# Patient Record
Sex: MALE | Race: ASIAN | NOT HISPANIC OR LATINO | Employment: OTHER | URBAN - METROPOLITAN AREA
[De-identification: names, ages, dates, MRNs, and addresses within clinical notes are randomized per-mention and may not be internally consistent; named-entity substitution may affect disease eponyms.]

---

## 2019-02-04 ENCOUNTER — TELEPHONE (OUTPATIENT)
Dept: SCHEDULING | Facility: IMAGING CENTER | Age: 69
End: 2019-02-04

## 2019-02-05 ENCOUNTER — OFFICE VISIT (OUTPATIENT)
Dept: INTERNAL MEDICINE | Facility: MEDICAL CENTER | Age: 69
End: 2019-02-05
Payer: MEDICARE

## 2019-02-05 VITALS
OXYGEN SATURATION: 96 % | DIASTOLIC BLOOD PRESSURE: 66 MMHG | SYSTOLIC BLOOD PRESSURE: 114 MMHG | TEMPERATURE: 97.7 F | HEIGHT: 64 IN | BODY MASS INDEX: 28.92 KG/M2 | RESPIRATION RATE: 19 BRPM | WEIGHT: 169.4 LBS | HEART RATE: 62 BPM

## 2019-02-05 DIAGNOSIS — G47.33 OSA (OBSTRUCTIVE SLEEP APNEA): ICD-10-CM

## 2019-02-05 DIAGNOSIS — I10 ESSENTIAL HYPERTENSION: ICD-10-CM

## 2019-02-05 DIAGNOSIS — E11.9 TYPE 2 DIABETES MELLITUS WITHOUT COMPLICATION, WITHOUT LONG-TERM CURRENT USE OF INSULIN (HCC): ICD-10-CM

## 2019-02-05 DIAGNOSIS — E03.9 HYPOTHYROIDISM, UNSPECIFIED TYPE: ICD-10-CM

## 2019-02-05 DIAGNOSIS — E78.5 HYPERLIPIDEMIA, UNSPECIFIED HYPERLIPIDEMIA TYPE: ICD-10-CM

## 2019-02-05 DIAGNOSIS — D56.3 BETA THALASSEMIA MINOR: ICD-10-CM

## 2019-02-05 PROCEDURE — G0009 ADMIN PNEUMOCOCCAL VACCINE: HCPCS | Performed by: INTERNAL MEDICINE

## 2019-02-05 PROCEDURE — 90670 PCV13 VACCINE IM: CPT | Performed by: INTERNAL MEDICINE

## 2019-02-05 PROCEDURE — G0008 ADMIN INFLUENZA VIRUS VAC: HCPCS | Performed by: INTERNAL MEDICINE

## 2019-02-05 PROCEDURE — 99204 OFFICE O/P NEW MOD 45 MIN: CPT | Mod: 25 | Performed by: INTERNAL MEDICINE

## 2019-02-05 PROCEDURE — 90662 IIV NO PRSV INCREASED AG IM: CPT | Performed by: INTERNAL MEDICINE

## 2019-02-05 RX ORDER — LEVOTHYROXINE SODIUM 0.12 MG/1
125 TABLET ORAL
Qty: 30 TAB | Refills: 6 | Status: SHIPPED | OUTPATIENT
Start: 2019-02-05

## 2019-02-05 RX ORDER — M-VIT,TX,IRON,MINS/CALC/FOLIC 27MG-0.4MG
1 TABLET ORAL DAILY
COMMUNITY

## 2019-02-05 RX ORDER — OLMESARTAN MEDOXOMIL 20 MG/1
20 TABLET ORAL DAILY
Qty: 30 TAB | Refills: 6 | Status: SHIPPED | OUTPATIENT
Start: 2019-02-05

## 2019-02-05 RX ORDER — ATORVASTATIN CALCIUM 10 MG/1
10 TABLET, FILM COATED ORAL DAILY
Qty: 30 TAB | Refills: 6 | Status: SHIPPED | OUTPATIENT
Start: 2019-02-05

## 2019-02-05 RX ORDER — METOPROLOL SUCCINATE 50 MG/1
50 TABLET, EXTENDED RELEASE ORAL DAILY
Qty: 30 TAB | Refills: 6 | Status: SHIPPED | OUTPATIENT
Start: 2019-02-05

## 2019-02-05 RX ORDER — SODIUM PHOSPHATE,MONO-DIBASIC 19G-7G/118
500 ENEMA (ML) RECTAL
COMMUNITY

## 2019-02-05 RX ORDER — OLMESARTAN MEDOXOMIL 20 MG/1
20 TABLET ORAL DAILY
COMMUNITY
End: 2019-02-05

## 2019-02-05 RX ORDER — ATORVASTATIN CALCIUM 10 MG/1
10 TABLET, FILM COATED ORAL NIGHTLY
COMMUNITY
End: 2019-02-05

## 2019-02-05 RX ORDER — METOPROLOL TARTRATE 50 MG/1
50 TABLET, FILM COATED ORAL
COMMUNITY
End: 2019-02-05

## 2019-02-05 ASSESSMENT — PAIN SCALES - GENERAL: PAINLEVEL: NO PAIN

## 2019-02-05 ASSESSMENT — PATIENT HEALTH QUESTIONNAIRE - PHQ9: CLINICAL INTERPRETATION OF PHQ2 SCORE: 0

## 2019-02-05 NOTE — PROGRESS NOTES
"Janey Prieto is a 68 y.o. male here for a non-provider visit for:   FLU    Reason for immunization: Overdue/Provider Recommended  Immunization records indicate need for vaccine: Yes, confirmed with [place] [facility]  Minimum interval has been met for this vaccine: Yes  ABN completed: Not Indicated    Order and dose verified by: ELAINE MERRITT Dated  08/07/2015 was given to patient: Yes  All IAC Questionnaire questions were answered \"No.\"    Patient tolerated injection and no adverse effects were observed or reported: Yes    Pt scheduled for next dose in series: Not Indicated    Janey Prieto is a 68 y.o. male here for a non-provider visit for:   PREVNAR (PCV13) 1 of 1    Reason for immunization: Overdue/Provider Recommended  Immunization records indicate need for vaccine: Yes, confirmed with [place] [facility]  Minimum interval has been met for this vaccine: Yes  ABN completed: Not Indicated    Order and dose verified by: ELAINE MERRITT Dated  11/05/2015 was given to patient: Yes  All IAC Questionnaire questions were answered \"No.\"    Patient tolerated injection and no adverse effects were observed or reported: Yes    Pt scheduled for next dose in series: Not Indicated    "

## 2019-02-05 NOTE — PROGRESS NOTES
New Patient to Establish    Reason to establish: New patient to establish    Chief Complaint   Patient presents with   • Hypertension     new patient   • Sleep Disruption     review health       HPI  Patient here to establish care. [place]ays [place] years in [place] - plans to be here for prolonged period of time. Needs to establish care.    68M retired  from [employer], known DM2, DLP, HTN, LEANN on CPAP, Hypothyroidism and beta-thal minor currently well. Patient stated CPAP machine occasionally turns off and sometimes does not apply enough pressure (?leak) and wants it fixed or exchanged.     Otherwise, patient's DM well controlled on only 1 metformin 500mg a day (reduction from BID dosing).     HTN well controlled on ARB and BBlocker - no hypotensive symptoms. No Dizziness, orthostatic symptoms. Highest values in the 140s / 80-90s.     Last TSH T4 4 years ago, when dose reduced from 150mcg to 125.    No DM complications, no TIA/Strokes, cardiac disease, peripheral arterial disease or symptoms. Patient walks 1 hour per day as exercise. Initially lost some weight intentionally when diagnosed (10 years ago) but has maintained steady weight for years now. Tests blood sugar Qweekly, ranges from 105-115 max. Uknown last A1c but presumed low as metformin dose reduced.     No symptoms from beta-thal - not on supplements.     ROS completely negative - describes mild hearing loss but insurance did not cover hearing aids. Patient agrees not to proceed until it becomes a problem in future.    Patient hasn't had vaccines in years. Agrees today for Flu and Prevnar 13, and told to go to pharmacy for TDaP and Shingles series; informed next year to be given Pneumococcal 23 polysaccharide.    Screening: Patient informed and not interested in PSA (also no BPH / prostate symptoms). Patient had last coloscopy 4 years ago and was negative. Told good for 10 years. (74 years old).    Patient  never smoker, so no need for AAA screening U/S.     No falls. Does all own ADLs and iADLs.       Current Medications re-prescribed today with 6 refills:    Metoprolol 50mg Qdaily (LA)  olmesartan 20mg PO Qdaily  Synthroid 125mcg PO Qdaily  Metformin 500mg PO Qdaily (previously BID)  Atorvastatin 10mg PO Qdaily  ASA 75mg PO Qdaily    Calcium and Vit D 600mg and 2000U  Multivitamin   Glucosamine and MSM Qdaily      Current Outpatient Prescriptions   Medication Sig Dispense Refill   • Aspirin 75 MG Chew Tab Take  by mouth.     • Calcium Carb-Cholecalciferol (CALCIUM + D3 PO) Take  by mouth.     • therapeutic multivitamin-minerals (THERAGRAN-M) Tab Take 1 Tab by mouth every day.     • glucosamine Sulfate 500 MG Cap Take 500 mg by mouth 3 times a day, with meals.     • metoprolol SR (TOPROL XL) 50 MG TABLET SR 24 HR Take 1 Tab by mouth every day. 30 Tab 6   • olmesartan (BENICAR) 20 MG Tab Take 1 Tab by mouth every day. 30 Tab 6   • levothyroxine (SYNTHROID) 125 MCG Tab Take 1 Tab by mouth Every morning on an empty stomach. 30 Tab 6   • atorvastatin (LIPITOR) 10 MG Tab Take 1 Tab by mouth every day. 30 Tab 6   • metFORMIN (GLUCOPHAGE) 500 MG Tab Take 1 Tab by mouth every day. 30 Tab 6     No current facility-administered medications for this visit.        Allergies as of 02/05/2019   • (No Known Allergies)       Social History     Social History   • Marital status:      Spouse name: N/A   • Number of children: N/A   • Years of education: N/A     Occupational History   • Not on file.     Social History Main Topics   • Smoking status: Never Smoker   • Smokeless tobacco: Never Used   • Alcohol use No   • Drug use: No   • Sexual activity: Not on file     Other Topics Concern   • Not on file     Social History Narrative   • No narrative on file       History reviewed. No pertinent family history.    ROS:   A complete 14-system review of systems was obtained and is otherwise negative except as stated in the history of  "present illness, below, and/or the pre-visit questionnaire.      As per HPI. Additional pertinent symptoms as noted below.    Constitutional: no fevers, weight loss  Eyes: vision stable, uses glasses, eye doctor (optometrist tomorrow)  ENT: Auditory impairment , chronic  Cardiovascular: no SOB, no chest pains, no palpitations, no dyspnea on exertion  Respiratory: no cough,   GI: no blood per rectum, a little constipation occasionally (skipping a day once in a while), last C-scope 4 years ago, no melena  : once a night, no issues urinating, no hesitency, no burning  Musculo-skeletal: no joint issues  Skin: brown papules/macules on face/neck, inherited, stable size  Neurological: no strokes, TIAs  Psychological: No depression, anhydonia  Other: none  All others negative    /66 (BP Location: Left arm, Patient Position: Sitting, BP Cuff Size: Adult) Comment: repeated bp 5 minutes  Pulse 62   Temp 36.5 °C (97.7 °F) (Temporal)   Resp 19   Ht 1.63 m (5' 4.17\")   Wt 76.8 kg (169 lb 6.4 oz)   SpO2 96%   BMI 28.92 kg/m²     Physical Exam  General:  Alert and oriented.  No apparent distress.    Eyes: Pupils equal and reactive to light. No scleral icterus. No conjunctival injection.      ENMT: Moist mucous membranes. Oropharynx clear. No erythema or exudates noted.     Resp: Clear to auscultation bilaterally. No rales, rhonchi, or wheezes.    Cardiovascular: Regular rate and normal rhythm. Grade 1-2 Aortic sclerosis/stenosis murmur without radiation only in right 2nd IC space otherwise no murmurs, rubs or gallops. 2+ radial pulses.     Abdomen: Soft, nontender, nondistended    Musculoskeletal: No clubbing, cyanosis, edema.    Skin: Clear. No rash noted. Macules on face / neck - stable size x years - none appearing suspicious.     Neuro: Cranial nerves II through XII intact. Strength 5/5 throughout, Cerebellar exam normal. Gait Normal. Reflexes bicep and patellar normal 2/2 - achilles not able to obtain (but done " quickly). Babinsky downgoing bilaterally.     10-filament normal both feet x all locations (right big toe had decreased sensation due to callus formation). No ulcers, or sores.    Psych: Mood euthymic. Affect congruent. PHQ 2 negative.    Labs/Studies      None present      Assessment and Plan    1. Essential hypertension  ARB and B-Blocker (may decide later to change B-blocker depending on indication)    Told to stop primary ASA if desired         2. Hyperlipidemia, unspecified hyperlipidemia type  Lipid Profile    Only on Lipitor 10mg PO Qdaily.     3. Hypothyroidism, unspecified type  TSH+FREE T4    Last checked 4y ago, reduced 150mcg to 125.     4. LEANN (obstructive sleep apnea)  REFERRAL TO SLEEP STUDIES    Required new machine or repairs     5. Type 2 diabetes mellitus without complication, without long-term current use of insulin (HCC)  REFERRAL TO OPHTHALMOLOGY  For annual reti screen.      COMP METABOLIC PANEL    HEMOGLOBIN A1C    MICROALBUMIN CREAT RATIO URINE     6.  7.   Beta thalassemia minor   Regular maintenance CBC WITH DIFFERENTIAL  INFLUENZA VACCINE, HIGH DOSE (65+ ONLY)   Prevnar 13 PCV-13  Zoster and TDaP at pharmacy  Next year [id number]    Taking Ca/Vit D emperically.               Follow-up  Return in about 6 months (around 8/5/2019).  Or when needed PRN    Signed by: Pollo Walton M.D.

## 2019-02-05 NOTE — PATIENT INSTRUCTIONS
I recommend the Shingrix vaccine series to prevent shingles.  Please get the vaccines at a local pharmacy.        I recommend a TDAP (tetanus, diptheria and whooping cough/pertussis vaccine).     Both vaccines can be received at the pharmacy.    Will call you with results of blood work.     Follow up apt in 1 month or when needed.

## 2019-06-13 ENCOUNTER — APPOINTMENT (OUTPATIENT)
Dept: SLEEP MEDICINE | Facility: MEDICAL CENTER | Age: 69
End: 2019-06-13
Payer: MEDICARE

## 2021-01-15 DIAGNOSIS — Z23 NEED FOR VACCINATION: ICD-10-CM
